# Patient Record
Sex: MALE | Race: BLACK OR AFRICAN AMERICAN | NOT HISPANIC OR LATINO | URBAN - METROPOLITAN AREA
[De-identification: names, ages, dates, MRNs, and addresses within clinical notes are randomized per-mention and may not be internally consistent; named-entity substitution may affect disease eponyms.]

---

## 2021-05-17 ENCOUNTER — OUTPATIENT (OUTPATIENT)
Dept: OUTPATIENT SERVICES | Facility: HOSPITAL | Age: 31
LOS: 1 days | End: 2021-05-17
Payer: COMMERCIAL

## 2021-05-17 DIAGNOSIS — Z11.52 ENCOUNTER FOR SCREENING FOR COVID-19: ICD-10-CM

## 2021-05-17 LAB — SARS-COV-2 RNA SPEC QL NAA+PROBE: SIGNIFICANT CHANGE UP

## 2021-05-17 PROCEDURE — U0005: CPT

## 2021-05-17 PROCEDURE — U0003: CPT

## 2021-05-17 PROCEDURE — C9803: CPT

## 2021-05-21 ENCOUNTER — OUTPATIENT (OUTPATIENT)
Dept: OUTPATIENT SERVICES | Facility: HOSPITAL | Age: 31
LOS: 1 days | End: 2021-05-21
Payer: COMMERCIAL

## 2021-05-21 DIAGNOSIS — Z11.52 ENCOUNTER FOR SCREENING FOR COVID-19: ICD-10-CM

## 2021-05-21 PROCEDURE — C9803: CPT

## 2021-05-21 PROCEDURE — U0003: CPT

## 2021-05-21 PROCEDURE — U0005: CPT

## 2021-05-22 LAB — SARS-COV-2 RNA SPEC QL NAA+PROBE: SIGNIFICANT CHANGE UP

## 2022-08-22 ENCOUNTER — EMERGENCY (EMERGENCY)
Facility: HOSPITAL | Age: 32
LOS: 1 days | Discharge: HOME | End: 2022-08-22
Attending: EMERGENCY MEDICINE | Admitting: EMERGENCY MEDICINE

## 2022-08-22 VITALS
RESPIRATION RATE: 18 BRPM | SYSTOLIC BLOOD PRESSURE: 111 MMHG | HEART RATE: 66 BPM | TEMPERATURE: 98 F | OXYGEN SATURATION: 100 % | DIASTOLIC BLOOD PRESSURE: 65 MMHG

## 2022-08-22 PROCEDURE — 99283 EMERGENCY DEPT VISIT LOW MDM: CPT

## 2022-08-22 NOTE — ED PROVIDER NOTE - CLINICAL SUMMARY MEDICAL DECISION MAKING FREE TEXT BOX
Pt is a 31 y/o male w/ left wrist pain that started few months ago.  Concern for carpal tunnel syndrome based on Pt's Hx and PE findings.  Will order x-ray of the wrist to evaluate for signs of soft-tissue inflammation, r/o fracture and evaluate osteopathic changes.  Will provide wrist splint and have educated the patient on its proper usage.  Will provide a follow-up with hand-specialist.  Of note, offered pain medication but Pt declined.  Will reassess.

## 2022-08-22 NOTE — ED PROVIDER NOTE - UPPER EXTREMITY EXAM, LEFT
Tenderness to palpation near snuffbox, no bony deformities, no obvious signs of trauma. No signs of vascular compromise noted. Significant limited ROM of left thumb, w/ moderate limited ROM of rest of left hand digits. Minimal swelling of left wrist in comparison to right. No erythema of skin noted./LIMITED ROM/TENDERNESS

## 2022-08-22 NOTE — ED PROVIDER NOTE - OBJECTIVE STATEMENT
Pt is a 31 y/o male w/ no reported PMH and Surgical history significant for microdiscectomy L1 p/w left wrist pain that started few months ago. Denies injury and trauma to the area. Reports that he slept on the wrist; However, the pain and discomfort persisted prompting this ED visit. Wrist pain is describe as sharp, intermittent, associated with movement, non-radiating, associated with limited ROM of thumbs. Pt admits to utilizing keyboard extensively at work (pt is works in management typing extensively). Denies using pain medication but have tried sleeves with minimal relief. Denies numbness, weakness, and tingling.  Allergic to shellfish (facial edema). Denies smoking, drinking and any illicit drug usage.

## 2022-08-22 NOTE — ED PROVIDER NOTE - NSFOLLOWUPINSTRUCTIONS_ED_ALL_ED_FT
Advance activity as tolerated. Follow up with your hand specialist in 48-72 hours- bring copies of your results. You will be given a wrist splint for support. Return to the ER for worsening or persistent symptoms, and/or ANY NEW OR CONCERNING SYMPTOMS. If you have issues obtaining follow up, please call: [phone] to obtain a doctor or specialist who takes your insurance in your area.  You may call [phone] to make an appointment with the internal medicine clinic.

## 2022-08-22 NOTE — ED PROVIDER NOTE - PATIENT PORTAL LINK FT
You can access the FollowMyHealth Patient Portal offered by [facility] by registering at the following website: [email or web address]. By joining [email or web address] FollowMyHealth portal, you will also be able to view your health information using other applications (apps) compatible with our system.

## 2022-08-23 DIAGNOSIS — Z98.890 OTHER SPECIFIED POSTPROCEDURAL STATES: Chronic | ICD-10-CM

## 2022-08-23 PROCEDURE — 73110 X-RAY EXAM OF WRIST: CPT | Mod: 26,LT

## 2023-02-13 ENCOUNTER — EMERGENCY (EMERGENCY)
Facility: HOSPITAL | Age: 33
LOS: 1 days | Discharge: HOME | End: 2023-02-13
Admitting: EMERGENCY MEDICINE
Payer: COMMERCIAL

## 2023-02-13 VITALS
RESPIRATION RATE: 16 BRPM | HEART RATE: 66 BPM | SYSTOLIC BLOOD PRESSURE: 118 MMHG | OXYGEN SATURATION: 100 % | DIASTOLIC BLOOD PRESSURE: 80 MMHG | TEMPERATURE: 99 F

## 2023-02-13 DIAGNOSIS — Z98.890 OTHER SPECIFIED POSTPROCEDURAL STATES: Chronic | ICD-10-CM

## 2023-02-13 PROCEDURE — 99284 EMERGENCY DEPT VISIT MOD MDM: CPT

## 2023-02-13 RX ORDER — LIDOCAINE 4 G/100G
1 CREAM TOPICAL
Qty: 5 | Refills: 0
Start: 2023-02-13 | End: 2023-02-17

## 2023-02-13 RX ORDER — CYCLOBENZAPRINE HYDROCHLORIDE 10 MG/1
1 TABLET, FILM COATED ORAL
Qty: 15 | Refills: 0
Start: 2023-02-13 | End: 2023-02-17

## 2023-02-13 RX ORDER — IBUPROFEN 200 MG
1 TABLET ORAL
Qty: 15 | Refills: 0
Start: 2023-02-13 | End: 2023-02-17

## 2023-02-13 NOTE — ED PROVIDER NOTE - PHYSICAL EXAMINATION
CONSTITUTIONAL: Well-appearing; well-nourished; in no apparent distress. Non-toxic appearing.   NEURO: Alert & oriented. Gait steady without assistance. Sensory and motor functions are grossly intact.  PSYCH: Mood appropriate. Thought processes intact.   NECK: Supple  CARD: Regular rate and rhythm, no murmurs  RESP: No accessory muscle use; breath sounds clear and equal bilaterally; no wheezes, rhonchi, or rales     ABD: Soft; non-distended; non-tender.   MUSCULOSKELETAL/EXTREMITIES: + tenderness on paraspinal muscle on T10-L5 b/l. no deformity. no midline tenderness. no swelling or erythema on skin.   SKIN: Warm; dry; no apparent lesions or exudate
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
